# Patient Record
Sex: FEMALE | URBAN - METROPOLITAN AREA
[De-identification: names, ages, dates, MRNs, and addresses within clinical notes are randomized per-mention and may not be internally consistent; named-entity substitution may affect disease eponyms.]

---

## 2018-10-31 ENCOUNTER — APPOINTMENT (OUTPATIENT)
Dept: PHYSICAL THERAPY | Age: 38
End: 2018-10-31

## 2023-01-11 ENCOUNTER — HOSPITAL ENCOUNTER (OUTPATIENT)
Dept: PHYSICAL THERAPY | Age: 43
Discharge: HOME | End: 2023-01-11
Payer: OTHER GOVERNMENT

## 2023-01-11 PROCEDURE — 97162 PT EVAL MOD COMPLEX 30 MIN: CPT

## 2023-01-11 PROCEDURE — 97140 MANUAL THERAPY 1/> REGIONS: CPT

## 2023-01-11 NOTE — PROGRESS NOTES
[facility]  [facility], [address] [facility], [address] [place], [place] [address]y [address],Abhishek 300  [phone] fax      Plan of Care/ Statement of Necessity for Physical Therapy Services    Patient name: Tj Booker Start of Care: 2023   Referral source: Tamie Gates DO : 1980    Medical Diagnosis: Pain in left leg [M79.605]  Payor:  / Plan: Jose Martin 75 / Product Type:  /  Onset Date:2022    Treatment Diagnosis: LBP, left leg pain   Prior Hospitalization: see medical history Provider#: [id number]   Medications: Verified on Patient summary List    Comorbidities: hx of right knee lateral release,  x2   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 Assessment/ key information: Patient is a 40 y. o.female who presents to PT with Pain in left leg [M79.605]. She reports pain started in July after having covid and trying to run. While previously able to run ultramarathons and lift weights without pain, she reports pain in lower back, hips and knees with any LE impact. She presents with lumbopelvic malalignment, thoracic kyphosis, increased lumbar lordosis, notable tightness in the left hip, and pain that prevents activity. The patient will benefit from skilled PT services to address these deficits and facilitate return to premorbid activity level and improved quality of life. Evaluation Complexity History LOW Complexity : Zero comorbidities / personal factors that will impact the outcome / POC; Examination MEDIUM Complexity : 3 Standardized tests and measures addressing body structure, function, activity limitation and / or participation in recreation  ;Presentation MEDIUM Complexity : Evolving with changing characteristics  ; Clinical Decision Making MEDIUM Complexity : FOTO score of 26-74  Overall Complexity Rating: MEDIUM  Problem List: pain affecting function, decrease ROM, decrease strength, decrease ADL/ functional abilitiies, decrease activity tolerance, and decrease flexibility/ joint mobility   Treatment Plan may include any combination of the following: Therapeutic exercise, Neuromuscular reeducation, Manual therapy, Therapeutic activity, Self care/home management, Electric stim unattended , Gait training, Ultrasound, and Electric stim attended  Patient / Family readiness to learn indicated by: asking questions, trying to perform skills, and interest  Persons(s) to be included in education: patient (P)  Barriers to Learning/Limitations: None  Patient Goal (s): increase flexibility  Patient Self Reported Health Status: excellent  Rehabilitation Potential: good    Short Term Goals: To be accomplished in 1 weeks:   1. Establish HEP for ROM and strengthening    Long Term Goals: To be accomplished in 10 treatments:   1. Patient will be independent and compliant with HEP for ROM and strengthening   2. Patient will increase FOTO > 5 pts to increase functional mobility within the home and community. 3. Patient will report <3/10 pain with mobility to increase functional activity tolerance  4. Patient will demo neutral lumbopelvic alignment x 3 consecutive visits to facilitate return to premorbid activity level. Frequency / Duration: Patient to be seen 2 times per week for 10 treatments. Patient/ Caregiver education and instruction: Diagnosis, prognosis, self care, activity modification, and exercises   [x]  Plan of care has been reviewed with JOSE Joshua, PT 1/11/2023 10:38 AM  ________________________________________________________________________    I certify that the above Therapy Services are being furnished while the patient is under my care. I agree with the treatment plan and certify that this therapy is necessary.     [facility] Signature:____________Date:_________TIME:________     Nj Mcclure DO  ** Signature, Date and Time must be completed for valid certification **      Please sign and return to In [address] [place] [address] [address]  [address] [place] [address], [address] [place] [address], [id number] 434,[facility] 300  [phone] fax

## 2023-01-11 NOTE — PROGRESS NOTES
PT DAILY TREATMENT NOTE     PPatient Name: Mary Boucher  Date:2023  : 1980  [x]  Patient  Verified  Payor:  / Plan: Matthieu [employer] / Product Type:  /    In time:1000  Out time:1030  Total Treatment Time (min): 30  Visit #: 1 of 10    Medicare/BCBS Only   Total Timed Codes (min):   1:1 Treatment Time:         Treatment Area: Pain in left leg [M79.605]    SUBJECTIVE  Pain Level (0-10 scale): 2  Any medication changes, allergies to medications, adverse drug reactions, diagnosis change, or new procedure performed?: [x] No    [] Yes (see summary sheet for update)  Subjective functional status/changes:   [x] see eval form in paper chart    OBJECTIVE    20 min []Eval                  []Re-Eval     10 min Manual Therapy:  left upslip correction, MFR left psoas   The manual therapy interventions were performed at a separate and distinct time from the therapeutic activities interventions. Rationale: decrease pain, increase tissue extensibility, and decrease trigger points to improve posture and functional alignment           With   [] TE   [] TA   [] neuro   [] other: Patient Education: [x] Review HEP    [] Progressed/Changed HEP based on:   [] positioning   [] body mechanics   [] transfers   [] heat/ice application    [] other:        Pain Level (0-10 scale) post treatment: 2    ASSESSMENT/Changes in Function:       [x]  See Plan of Care  []  See progress note/recertification  []  See Discharge Summary         Goals:  Short Term Goals: To be accomplished in 1 weeks:               1. Establish HEP for ROM and strengthening  Eval: HEP for self psoas release with tennis ball to tolerance     Long Term Goals: To be accomplished in 10 treatments:               1. Patient will be independent and compliant with HEP for ROM and strengthening              2. Patient will increase FOTO > 5 pts to increase functional mobility within the home and community. Eval: 66  3.  Patient will report <3/10 pain with mobility to increase functional activity tolerance  Eval: 8/10 at worst  4. Patient will demo neutral lumbopelvic alignment x 3 consecutive visits to facilitate return to premorbid activity level.   Eval: increased lordosis, left post rotation, left upslip    PLAN  []  Upgrade activities as tolerated     [x]  Continue plan of care  []  Update interventions per flow sheet       []  Discharge due to:_  []  Other:_      Quynh Snider, PT 1/11/2023  10:43 AM    Future Appointments   Date Time Provider Iris Cervantes   1/13/2023  9:00 AM Lizeth Hummel, PTA [facility]   1/18/2023  1:00 PM Lizeth Hummel, PTA [facility]   1/20/2023  9:30 AM Bianca Delgadillo, PTA [facility]   1/25/2023 10:30 AM Lizeth Hummel, PTA [facility]   1/27/2023  9:30 AM Lizeth Hummel, PTA [facility]   2/1/2023  9:30 AM Leslye Seip, DPT [facility]   2/3/2023  9:30 AM See Jj, DPT [facility]

## 2023-01-13 ENCOUNTER — HOSPITAL ENCOUNTER (OUTPATIENT)
Dept: PHYSICAL THERAPY | Age: 43
Discharge: HOME | End: 2023-01-13
Payer: OTHER GOVERNMENT

## 2023-01-13 PROCEDURE — 97110 THERAPEUTIC EXERCISES: CPT

## 2023-01-13 PROCEDURE — 97112 NEUROMUSCULAR REEDUCATION: CPT

## 2023-01-13 PROCEDURE — 97140 MANUAL THERAPY 1/> REGIONS: CPT

## 2023-01-13 NOTE — PROGRESS NOTES
PT DAILY TREATMENT NOTE     Patient Name: Sue Shaffer  Date:2023  : 1980  [x]  Patient  Verified  Payor: LATOYA / Plan: Jose Martin 74 / Product Type:  /    In time:858 am   Out time:945 am   Total Treatment Time (min): 47  Visit #: 2 of 10     Treatment Area: Pain in left leg [M79.605]  Other low back pain [M54.59]    SUBJECTIVE  Pain Level (0-10 scale): 0/10   Any medication changes, allergies to medications, adverse drug reactions, diagnosis change, or new procedure performed?: [x] No    [] Yes (see summary sheet for update)  Subjective functional status/changes:   [] No changes reported  Patient reports having increased low back after getting up this morning. Patient denies any pain down her left leg today. Patient states that she does not have any low back right now. OBJECTIVE    Modality rationale: decrease inflammation, decrease pain, and increase tissue extensibility to improve the patients ability to assist with performing ADL's and functional tasks without limitations.     Min Type Additional Details    [] Estim:  []Unatt       []IFC  []Premod                        []Other:  []w/ice   []w/heat  Position:  Location:    [] Estim: []Att    []TENS instruct  []NMES                    []Other:  []w/US   []w/ice   []w/heat  Position:  Location:    []  Traction: [] Cervical       []Lumbar                       [] Prone          []Supine                       []Intermittent   []Continuous Lbs:  [] before manual  [] after manual    []  Ultrasound: []Continuous   [] Pulsed                           []1MHz   []3MHz W/cm2:  Location:    []  Iontophoresis with dexamethasone         Location: [] Take home patch   [] In clinic   10 []  Ice     [x]  heat  []  Ice massage  []  Laser   []  Anodyne Position: supine  Location: L/S    []  Laser with stim  []  Other:  Position:  Location:    []  Vasopneumatic Device    []  Right     []  Left  Pre-treatment girth:  Post-treatment girth: Measured at (location):  Pressure:       [] lo [] med [] hi   Temperature: [] lo [] med [] hi   [] Skin assessment post-treatment:  []intact []redness- no adverse reaction  []redness - adverse reaction:       15 min Therapeutic Exercise:  [] See flow sheet :   Rationale: increase ROM and increase strength to improve the patients overall muscle endurance and activity tolerance for ADL's and functional tasks. min Therapeutic Activity:  []  See flow sheet :   Rationale: increase strength and improve coordination  to improve the patients ability to safely perform dynamic activities and to improve functional performance of  ADL's.     12 min Neuromuscular Re-education:  []  See flow sheet :   Rationale: increase strength, improve coordination, and improve balance  to improve the patients ability to perform activities with good form, stability and improve proprioception. 10 min Manual Therapy:  Shot gun mobs/MET; DTM and stretching (B) piriformis and glute med    The manual therapy interventions were performed at a separate and distinct time from the therapeutic activities interventions. Rationale: decrease pain, increase ROM, increase tissue extensibility, decrease trigger points, and increase postural awareness to assist with reducing SI joint dysfunction and decreasing low back pain. With   [] TE   [] TA   [] neuro   [] other: Patient Education: [x] Review HEP    [] Progressed/Changed HEP based on:   [] positioning   [] body mechanics   [] transfers   [] heat/ice application    [] other:      Other Objective/Functional Measures:   Initiated exercises set a initial evaluation. Verbal cues required for proper form. Left posteriorly rotated SI joint      Pain Level (0-10 scale) post treatment: 0/10    ASSESSMENT/Changes in Function:   Patient is progressing as expected towards goals.  Introduced exercises, as per flow sheet, to assist with improving core strength and improving (B) LE mobility. Decreased (B) LE hamstring flexibility noted with today's exercises. SI joint dysfunction improved following manual therapy. Patient responded well to today's overall session, as evident by, no increase in low back pain and improved exercise tolerance. Patient will continue to benefit from skilled PT services to modify and progress therapeutic interventions, address functional mobility deficits, address ROM deficits, address strength deficits, analyze and address soft tissue restrictions, analyze and cue movement patterns, analyze and modify body mechanics/ergonomics, assess and modify postural abnormalities, and instruct in home and community integration to attain remaining goals. []  See Plan of Care  []  See progress note/recertification  []  See Discharge Summary         Progress towards goals / Updated goals:  Short Term Goals: To be accomplished in 1 weeks:               1. Establish HEP for ROM and strengthening  Eval: HEP for self psoas release with tennis ball to tolerance     Long Term Goals: To be accomplished in 10 treatments:               1. Patient will be independent and compliant with HEP for ROM and strengthening              2. Patient will increase FOTO > 5 pts to increase functional mobility within the home and community. Eval: 66  3. Patient will report <3/10 pain with mobility to increase functional activity tolerance  Eval: 8/10 at worst  4. Patient will demo neutral lumbopelvic alignment x 3 consecutive visits to facilitate return to premorbid activity level.   Eval: increased lordosis, left post rotation, left upslip     PLAN  [x]  Upgrade activities as tolerated     [x]  Continue plan of care  []  Update interventions per flow sheet       []  Discharge due to:_  []  Other:_      Gretel Garay PTA 1/13/2023  9:15 AM    Future Appointments   Date Time Provider Iris Cervantes   1/18/2023  1:00 PM [facility]   1/20/2023  9:30 AM Gena Mancini, PTA [facility]   1/25/2023 10:30 AM Lazaro Whitmore, JOSE [facility]   1/27/2023  9:30 AM Lazaro Whitmore, JOSE [facility]   2/1/2023  9:30 AM Jeramie Baez, KETAN [facility]   2/3/2023  9:30 AM Lucy Jj, ZACHARYT [facility]

## 2023-01-18 ENCOUNTER — HOSPITAL ENCOUNTER (OUTPATIENT)
Dept: PHYSICAL THERAPY | Age: 43
Discharge: HOME | End: 2023-01-18
Payer: OTHER GOVERNMENT

## 2023-01-18 PROCEDURE — 97140 MANUAL THERAPY 1/> REGIONS: CPT

## 2023-01-18 PROCEDURE — 97112 NEUROMUSCULAR REEDUCATION: CPT

## 2023-01-18 PROCEDURE — 97535 SELF CARE MNGMENT TRAINING: CPT

## 2023-01-18 PROCEDURE — 97110 THERAPEUTIC EXERCISES: CPT

## 2023-01-18 NOTE — PROGRESS NOTES
PT DAILY TREATMENT NOTE     Patient Name: Sue Shaffer  Date:2023  : 1980  [x]  Patient  Verified  Payor: LATOYA / Plan: Jose Martin 74 / Product Type:  /    In time: 100 pm    Out time: 143 pm    Total Treatment Time (min): 43  Visit #: 3 of 10     Treatment Area: Pain in left leg [M79.605]  Other low back pain [M54.59]    SUBJECTIVE  Pain Level (0-10 scale): 2/10   Any medication changes, allergies to medications, adverse drug reactions, diagnosis change, or new procedure performed?: [x] No    [] Yes (see summary sheet for update)  Subjective functional status/changes:   [] No changes reported  Patient reports having a little bit of low back discomfort. Patient explains that she raked some leaves today and that my have caused the increase in pain today. Patient denies any pain in her left leg today. Patient also explained that her MRI showed that L4-L5 are bulging. Patient states that her doctor would like her to continue with PT.        OBJECTIVE    Modality rationale: Patient declined modalities today.      Min Type Additional Details    [] Estim:  []Unatt       []IFC  []Premod                        []Other:  []w/ice   []w/heat  Position:  Location:    [] Estim: []Att    []TENS instruct  []NMES                    []Other:  []w/US   []w/ice   []w/heat  Position:  Location:    []  Traction: [] Cervical       []Lumbar                       [] Prone          []Supine                       []Intermittent   []Continuous Lbs:  [] before manual  [] after manual    []  Ultrasound: []Continuous   [] Pulsed                           []1MHz   []3MHz W/cm2:  Location:    []  Iontophoresis with dexamethasone         Location: [] Take home patch   [] In clinic    []  Ice     []  heat  []  Ice massage  []  Laser   []  Anodyne Position:   Location:     []  Laser with stim  []  Other:  Position:  Location:    []  Vasopneumatic Device    []  Right     []  Left  Pre-treatment girth:  Post-treatment girth:  Measured at (location):  Pressure:       [] lo [] med [] hi   Temperature: [] lo [] med [] hi   [] Skin assessment post-treatment:  []intact []redness- no adverse reaction  []redness - adverse reaction:       15 min Therapeutic Exercise:  [] See flow sheet :   Rationale: increase ROM and increase strength to improve the patients overall muscle endurance and activity tolerance for ADL's and functional tasks. min Therapeutic Activity:  []  See flow sheet :   Rationale: increase strength and improve coordination  to improve the patients ability to safely perform dynamic activities and to improve functional performance of  ADL's. 10 min Neuromuscular Re-education:  []  See flow sheet :   Rationale: increase strength, improve coordination, and improve balance  to improve the patients ability to perform activities with good form, stability and improve proprioception. 10 min Manual Therapy:  Shot gun mobs/MET; DTM and stretching (B) piriformis and glute med    The manual therapy interventions were performed at a separate and distinct time from the therapeutic activities interventions. Rationale: decrease pain, increase ROM, increase tissue extensibility, decrease trigger points, and increase postural awareness to assist with reducing SI joint dysfunction and decreasing low back pain. 8 min Self Care/Home Management: Prone press up HEP and Felicitas method    Rationale: increase strength, improve coordination, and improve balance  to improve the patients ability to perform HEP safely and effectively in order to perform all functional activities with ease. With   [] TE   [] TA   [] neuro   [] other: Patient Education: [x] Review HEP    [] Progressed/Changed HEP based on:   [] positioning   [] body mechanics   [] transfers   [] heat/ice application    [] other:      Other Objective/Functional Measures:    Added [place] ext and pall of, prone hip extension, alt swimmers and prone press ups     Symmetrical SI joint     Pain Level (0-10 scale) post treatment: 0/10    ASSESSMENT/Changes in Function:   Progressed exercises, as per flow sheet, to assist with increasing core and low back strength. Discussed with patient the Felicitas method and it's benefits of decreasing left LE radicular symptoms. Also instructed on and issued updated HEP. Patient verbalized and demonstrated understanding. Patient presented with symmetrical SI joint today. Patient responded well to today's overall session, as evident by, a decrease in low back pain and improved exercise tolerance. Will continue to progress patient as tolerated. Patient will continue to benefit from skilled PT services to modify and progress therapeutic interventions, address functional mobility deficits, address ROM deficits, address strength deficits, analyze and address soft tissue restrictions, analyze and cue movement patterns, analyze and modify body mechanics/ergonomics, assess and modify postural abnormalities, and instruct in home and community integration to attain remaining goals. []  See Plan of Care  []  See progress note/recertification  []  See Discharge Summary         Progress towards goals / Updated goals:  Short Term Goals: To be accomplished in 1 weeks:               1. Establish HEP for ROM and strengthening  Eval: HEP for self psoas release with tennis ball to tolerance     Long Term Goals: To be accomplished in 10 treatments:               1. Patient will be independent and compliant with HEP for ROM and strengthening              Current: Updated HEP today. 1/18/23  2. Patient will increase FOTO > 5 pts to increase functional mobility within the home and community. Eval: 66  Current: Ongoing, will assess at next PN. 1/18/23  3. Patient will report <3/10 pain with mobility to increase functional activity tolerance  Eval: 8/10 at worst  4.  Patient will demo neutral lumbopelvic alignment x 3 consecutive visits to facilitate return to premorbid activity level. Eval: increased lordosis, left post rotation, left upslip  Current: Patient presented with symmetrical SI joint today. 1/18/23     PLAN  [x]  Upgrade activities as tolerated     [x]  Continue plan of care  []  Update interventions per flow sheet       []  Discharge due to:_  [x]  Other:Assess effectiveness of updated HEP.       Kandace Montoya PTA 1/18/2023  9:15 AM    Future Appointments   Date Time Provider Iris Cervantes   1/18/2023  1:00 PM Guanakito Villareal PTA [facility]   1/20/2023  9:30 AM Nafisa Stuart PTA [facility]   1/25/2023 10:30 AM Guanakito Villareal PTA [facility]   1/27/2023  9:30 AM Guanakito Villareal PTA [facility]   2/1/2023  9:30 AM ZACHARY ChewT [facility]   2/3/2023  9:30 AM Pancho Jj, ZACHARYT [facility]

## 2023-01-20 ENCOUNTER — HOSPITAL ENCOUNTER (OUTPATIENT)
Dept: PHYSICAL THERAPY | Age: 43
Discharge: HOME | End: 2023-01-20
Payer: OTHER GOVERNMENT

## 2023-01-20 PROCEDURE — 97112 NEUROMUSCULAR REEDUCATION: CPT

## 2023-01-20 PROCEDURE — 97110 THERAPEUTIC EXERCISES: CPT

## 2023-01-20 NOTE — PROGRESS NOTES
PT DAILY TREATMENT NOTE     Patient Name: Ken Bañuelos  Date:2023  : 1980  [x]  Patient  Verified  Payor: LATOYA / Plan: Jose Martin 75 / Product Type:  /    In time:9:31  Out time:10:08  Total Treatment Time (min): 37  Visit #: 4 of 10    Medicare/BCBS Only   Total Timed Codes (min):   1:1 Treatment Time:         Treatment Area: Pain in left leg [M79.605]  Other low back pain [M54.59]    SUBJECTIVE  Pain Level (0-10 scale): 0  Any medication changes, allergies to medications, adverse drug reactions, diagnosis change, or new procedure performed?: [x] No    [] Yes (see summary sheet for update)  Subjective functional status/changes:   [] No changes reported  \"Im just tight but no pain. \"    OBJECTIVE    Modality rationale: decrease pain, increase tissue extensibility, and increase muscle contraction/control to improve the patients ability to    Min Type Additional Details    [] Estim:  []Unatt       []IFC  []Premod                        []Other:  []w/ice   []w/heat  Position:  Location:    [] Estim: []Att    []TENS instruct  []NMES                    []Other:  []w/US   []w/ice   []w/heat  Position:  Location:    []  Traction: [] Cervical       []Lumbar                       [] Prone          []Supine                       []Intermittent   []Continuous Lbs:  [] before manual  [] after manual    []  Ultrasound: []Continuous   [] Pulsed                           []1MHz   []3MHz W/cm2:  Location:    []  Iontophoresis with dexamethasone         Location: [] Take home patch   [] In clinic    []  Ice     []  heat  []  Ice massage  []  Laser   []  Anodyne Position:  Location:    []  Laser with stim  []  Other:  Position:  Location:    []  Vasopneumatic Device    []  Right     []  Left  Pre-treatment girth:  Post-treatment girth:  Measured at (location):  Pressure:       [] lo [] med [] hi   Temperature: [] lo [] med [] hi   [x] Skin assessment post-treatment:  [x]intact []redness- no adverse reaction    []redness - adverse reaction:      min []Eval                  []Re-Eval       22 min Therapeutic Exercise:  [x] See flow sheet :   Rationale: increase ROM and increase strength to improve the patients ability to perform ADL      min Therapeutic Activity:  [x]  See flow sheet :   Rationale: increase ROM, increase strength, and improve coordination  to improve the patients ability to perform job duties with no difficulty     10 min Neuromuscular Re-education:  [x]  See flow sheet :   Rationale: increase ROM, increase strength, improve coordination, improve balance, and increase proprioception  to improve the patients ability to perform ADL     5 min Manual Therapy:  checked alignment:    The manual therapy interventions were performed at a separate and distinct time from the therapeutic activities interventions. Rationale: decrease pain, increase ROM, increase tissue extensibility, decrease edema , decrease trigger points, and increase postural awareness to perform daily chores     min Gait Training:  ___ feet with ___ device on level surfaces with ___ level of assist   Rationale:     min Self Care/Home Management:    Rationale: increase ROM, increase strength, and improve coordination  to improve the patients ability to perform household chores          With   [] TE   [] TA   [] neuro   [] other: Patient Education: [x] Review HEP    [] Progressed/Changed HEP based on:   [] positioning   [] body mechanics   [] transfers   [] heat/ice application    [] other:      Other Objective/Functional Measures:  (B) innominate symmetrical    Pain Level (0-10 scale) post treatment:0     ASSESSMENT/Changes in Function: today's session focused on strengthening LSP/left leg and for pain reduction. Pt completed each strengthening there ex well with no pain  or tingling at left LE but soreness at LSP. Pt required cues on log roll when performing bed mobility prone to sit.   Overall pt is progressing towards all goals. Patient will continue to benefit from skilled PT services to modify and progress therapeutic interventions, address functional mobility deficits, address ROM deficits, address strength deficits, analyze and address soft tissue restrictions, analyze and cue movement patterns, analyze and modify body mechanics/ergonomics, assess and modify postural abnormalities, and instruct in home and community integration to attain remaining goals. [x]  See Plan of Care  []  See progress note/recertification  []  See Discharge Summary         Progress towards goals / Updated goals:     1. Establish HEP for ROM and strengthening  Eval: HEP for self psoas release with tennis ball to tolerance     Long Term Goals: To be accomplished in 10 treatments:               1. Patient will be independent and compliant with HEP for ROM and strengthening              Current: Updated HEP today. 1/18/23  2. Patient will increase FOTO > 5 pts to increase functional mobility within the home and community. Eval: 66  Current: Ongoing, will assess at next PN. 1/18/23  3. Patient will report <3/10 pain with mobility to increase functional activity tolerance  Eval: 8/10 at worst  4. Patient will demo neutral lumbopelvic alignment x 3 consecutive visits to facilitate return to premorbid activity level. Eval: increased lordosis, left post rotation, left upslip  Current: Patient presented with symmetrical SI joint today.  1/18/23       PLAN  []  Upgrade activities as tolerated     [x]  Continue plan of care  []  Update interventions per flow sheet       []  Discharge due to:_  []  Other:_      Marlys Mancini PTA 1/20/2023  9:39 AM    Future Appointments   Date Time Provider Iris Cervantes   1/25/2023 10:30 AM Cecelia Villar PTA [facility]   1/27/2023  9:30 AM Cecelia Villar PTA [facility]   2/1/2023  9:30 AM Marie Akins DPT [facility]   2/3/2023  9:30 AM Jhonny Jj DPT [facility]

## 2023-01-25 ENCOUNTER — HOSPITAL ENCOUNTER (OUTPATIENT)
Dept: PHYSICAL THERAPY | Age: 43
Discharge: HOME | End: 2023-01-25
Payer: OTHER GOVERNMENT

## 2023-01-25 PROCEDURE — 97110 THERAPEUTIC EXERCISES: CPT

## 2023-01-25 PROCEDURE — 97140 MANUAL THERAPY 1/> REGIONS: CPT

## 2023-01-25 PROCEDURE — 97530 THERAPEUTIC ACTIVITIES: CPT

## 2023-01-25 PROCEDURE — 97112 NEUROMUSCULAR REEDUCATION: CPT

## 2023-01-25 NOTE — PROGRESS NOTES
PT DAILY TREATMENT NOTE     Patient Name: Kei Escamilla  Date:2023  : 1980  [x]  Patient  Verified  Payor: LATOYA / Plan: Jose Martin 75 / Product Type:  /    In time: 5833 am    Out time: 1109 am   Total Treatment Time (min): 42  Visit #: 5 of 10     Treatment Area: Pain in left leg [M79.605]  Other low back pain [M54.59]    SUBJECTIVE  Pain Level (0-10 scale): 0/10   Any medication changes, allergies to medications, adverse drug reactions, diagnosis change, or new procedure performed?: [x] No    [] Yes (see summary sheet for update)  Subjective functional status/changes:   [] No changes reported  Patient states having increased tightness in her low back and hamstrings today. Patient explains that she has able to do kettle bell swings for the first time with no increase in low back pain or leg pain. Patient reports that the press ups are awkward but they have helped. OBJECTIVE    Modality rationale: Patient declined modalities today.      Min Type Additional Details    [] Estim:  []Unatt       []IFC  []Premod                        []Other:  []w/ice   []w/heat  Position:  Location:    [] Estim: []Att    []TENS instruct  []NMES                    []Other:  []w/US   []w/ice   []w/heat  Position:  Location:    []  Traction: [] Cervical       []Lumbar                       [] Prone          []Supine                       []Intermittent   []Continuous Lbs:  [] before manual  [] after manual    []  Ultrasound: []Continuous   [] Pulsed                           []1MHz   []3MHz W/cm2:  Location:    []  Iontophoresis with dexamethasone         Location: [] Take home patch   [] In clinic    []  Ice     []  heat  []  Ice massage  []  Laser   []  Anodyne Position:   Location:     []  Laser with stim  []  Other:  Position:  Location:    []  Vasopneumatic Device    []  Right     []  Left  Pre-treatment girth:  Post-treatment girth:  Measured at (location):  Pressure:       [] lo [] med [] hi   Temperature: [] lo [] med [] hi   [] Skin assessment post-treatment:  []intact []redness- no adverse reaction  []redness - adverse reaction:       14 min Therapeutic Exercise:  [] See flow sheet :   Rationale: increase ROM and increase strength to improve the patients overall muscle endurance and activity tolerance for ADL's and functional tasks. 8 min Therapeutic Activity:  []  See flow sheet :   Rationale: increase strength and improve coordination  to improve the patients ability to safely perform dynamic activities and to improve functional performance of  ADL's. 10 min Neuromuscular Re-education:  []  See flow sheet :   Rationale: increase strength, improve coordination, and improve balance  to improve the patients ability to perform activities with good form, stability and improve proprioception. 10 min Manual Therapy:  Shot gun mobs/MET; DTM and stretching (B) piriformis and glute med    The manual therapy interventions were performed at a separate and distinct time from the therapeutic activities interventions. Rationale: decrease pain, increase ROM, increase tissue extensibility, decrease trigger points, and increase postural awareness to assist with reducing SI joint dysfunction and decreasing low back pain. With   [] TE   [] TA   [] neuro   [] other: Patient Education: [x] Review HEP    [] Progressed/Changed HEP based on:   [] positioning   [] body mechanics   [] transfers   [] heat/ice application    [] other:      Other Objective/Functional Measures: Added lateral ambulation with PTB, and bridge ladder with PTB    Left posteriorly rotated SI joint    Pain Level (0-10 scale) post treatment: 0/10    ASSESSMENT/Changes in Function:   Patient presents to PT with no low back pain today. Progressed exercises, as per flow sheet, to further assist with increasing core and (B glute strength. Increased muscle fatigue was experienced with today's progressed exercises. Patient presented with SI joint dysfunction that improved following manual therapy. Left LE tightness noted during manual therapy. Patient responded well to today's session, as evident by, improved exercise tolerance and no increase in low back or left LE symptoms. Will continue to progress patient as tolerated. Patient will continue to benefit from skilled PT services to modify and progress therapeutic interventions, address functional mobility deficits, address ROM deficits, address strength deficits, analyze and address soft tissue restrictions, analyze and cue movement patterns, analyze and modify body mechanics/ergonomics, assess and modify postural abnormalities, and instruct in home and community integration to attain remaining goals. []  See Plan of Care  []  See progress note/recertification  []  See Discharge Summary         Progress towards goals / Updated goals:    1. Establish HEP for ROM and strengthening  Eval: HEP for self psoas release with tennis ball to tolerance     Long Term Goals: To be accomplished in 10 treatments:               1. Patient will be independent and compliant with HEP for ROM and strengthening              Current: Patient is complaint with press up HEP. 1/25/2023  2. Patient will increase FOTO > 5 pts to increase functional mobility within the home and community. Eval: 66  Current: Ongoing, will assess at next PN. 1/25/23  3. Patient will report <3/10 pain with mobility to increase functional activity tolerance  Eval: 8/10 at worst  Current: Patient reports 0/10 pain today. 1/25/2023  4. Patient will demo neutral lumbopelvic alignment x 3 consecutive visits to facilitate return to premorbid activity level. Eval: increased lordosis, left post rotation, left upslip  Current: Patient presented with left posteriorly rotated SI joint.  1/25/23    PLAN  [x]  Upgrade activities as tolerated     [x]  Continue plan of care  []  Update interventions per flow sheet       [] Discharge due to:_  []  Other:    Humberto Diego, JOSE 1/25/2023  9:15 AM    Future Appointments   Date Time Provider Iris Cervantes   1/27/2023  9:30 AM Peg Spencerly, PTA [facility]   2/1/2023  9:30 AM Lori Granda DPT [facility]   2/3/2023  9:30 AM Lori Granda DPT [facility]   2/6/2023 10:00 AM Peg Nicely, PTA [facility]   2/8/2023 10:30 AM Peg Nicely, PTA [facility]

## 2023-01-27 ENCOUNTER — HOSPITAL ENCOUNTER (OUTPATIENT)
Dept: PHYSICAL THERAPY | Age: 43
Discharge: HOME | End: 2023-01-27
Payer: OTHER GOVERNMENT

## 2023-01-27 PROCEDURE — 97112 NEUROMUSCULAR REEDUCATION: CPT

## 2023-01-27 PROCEDURE — 97140 MANUAL THERAPY 1/> REGIONS: CPT

## 2023-01-27 PROCEDURE — 97110 THERAPEUTIC EXERCISES: CPT

## 2023-01-27 NOTE — PROGRESS NOTES
PT DAILY TREATMENT NOTE     Patient Name: Reinaldo Osullivan  Date:2023  : 1980  [x]  Patient  Verified  Payor: LATOYA / Plan: Jose Martin 74 / Product Type:  /    In time: 930 am  Out time: 1006 am   Total Treatment Time (min): 36   Visit #: 6 of 10     Treatment Area: Pain in left leg [M79.605]  Other low back pain [M54.59]    SUBJECTIVE  Pain Level (0-10 scale): 0/10   Any medication changes, allergies to medications, adverse drug reactions, diagnosis change, or new procedure performed?: [x] No    [] Yes (see summary sheet for update)  Subjective functional status/changes:   [] No changes reported  Patient reports having \"nerve pain\" in both calfs some time after last visit. Patient states that it lasted for a couple hours. \"It felt like if something would just pop, it would feel better. \" Patient explains that she does not remember doing press ups during the time she was having calf pain but does perform them often because they help. OBJECTIVE    Modality rationale: Patient declined modalities today.      Min Type Additional Details    [] Estim:  []Unatt       []IFC  []Premod                        []Other:  []w/ice   []w/heat  Position:  Location:    [] Estim: []Att    []TENS instruct  []NMES                    []Other:  []w/US   []w/ice   []w/heat  Position:  Location:    []  Traction: [] Cervical       []Lumbar                       [] Prone          []Supine                       []Intermittent   []Continuous Lbs:  [] before manual  [] after manual    []  Ultrasound: []Continuous   [] Pulsed                           []1MHz   []3MHz W/cm2:  Location:    []  Iontophoresis with dexamethasone         Location: [] Take home patch   [] In clinic    []  Ice     []  heat  []  Ice massage  []  Laser   []  Anodyne Position:   Location:     []  Laser with stim  []  Other:  Position:  Location:    []  Vasopneumatic Device    []  Right     []  Left  Pre-treatment girth:  Post-treatment girth:  Measured at (location):  Pressure:       [] lo [] med [] hi   Temperature: [] lo [] med [] hi   [] Skin assessment post-treatment:  []intact []redness- no adverse reaction  []redness - adverse reaction:       11 min Therapeutic Exercise:  [] See flow sheet :   Rationale: increase ROM and increase strength to improve the patients overall muscle endurance and activity tolerance for ADL's and functional tasks. min Therapeutic Activity:  []  See flow sheet :   Rationale: increase strength and improve coordination  to improve the patients ability to safely perform dynamic activities and to improve functional performance of  ADL's. 15 min Neuromuscular Re-education:  []  See flow sheet :   Rationale: increase strength, improve coordination, and improve balance  to improve the patients ability to perform activities with good form, stability and improve proprioception. 10 min Manual Therapy:  Shot gun mobs/MET; DTM and stretching (B) piriformis and glute med    The manual therapy interventions were performed at a separate and distinct time from the therapeutic activities interventions. Rationale: decrease pain, increase ROM, increase tissue extensibility, decrease trigger points, and increase postural awareness to assist with reducing SI joint dysfunction and decreasing low back pain. With   [] TE   [] TA   [] neuro   [] other: Patient Education: [x] Review HEP    [] Progressed/Changed HEP based on:   [] positioning   [] body mechanics   [] transfers   [] heat/ice application    [] other:      Other Objective/Functional Measures: Added triple threats, bridges with (B) shoulder extension at [place]'s weight of exercises, as per flow shet    Symmetrical SI joint    Pain Level (0-10 scale) post treatment: 0/10    ASSESSMENT/Changes in Function:   Progressed exercises, as per flow sheet, to assist with increasing both core and low back strength.  Verbal cues were required to engage core with today's progressed exercises. Patient presented with symmetrical SI joint today. Increased muscle fatigue was experienced with at the end of today's session. Patient is progressing as expected towards goals. Patient responded well to today's session, as evident by, improved exercise tolerance and no increase in left LE radicular symptoms. Patient will continue to benefit from skilled PT services to modify and progress therapeutic interventions, address functional mobility deficits, address ROM deficits, address strength deficits, analyze and address soft tissue restrictions, analyze and cue movement patterns, analyze and modify body mechanics/ergonomics, assess and modify postural abnormalities, and instruct in home and community integration to attain remaining goals. []  See Plan of Care  []  See progress note/recertification  []  See Discharge Summary         Progress towards goals / Updated goals:    1. Establish HEP for ROM and strengthening  Eval: HEP for self psoas release with tennis ball to tolerance     Long Term Goals: To be accomplished in 10 treatments:         1. Patient will be independent and compliant with HEP for ROM and strengthening              Current: Patient is complaint with press up HEP. 1/27/2023    2. Patient will increase FOTO > 5 pts to increase functional mobility within the home and community. Eval: 66  Current: Ongoing, will assess at next PN. 1/27/23    3. Patient will report <3/10 pain with mobility to increase functional activity tolerance  Eval: 8/10 at worst  Current: Patient reports 0/10 pain today. 1/27/2023    4. Patient will demo neutral lumbopelvic alignment x 3 consecutive visits to facilitate return to premorbid activity level. Eval: increased lordosis, left post rotation, left upslip  Current: Patient presented with symmetrical SI joint.  1/27/23    PLAN  [x]  Upgrade activities as tolerated     [x]  Continue plan of care  []  Update interventions per flow sheet       []  Discharge due to:_  []  Other:    Yudy Zuniga PTA 1/27/2023  9:15 AM    Future Appointments   Date Time Provider Iris Cervantes   1/27/2023  9:30 AM Yoel Darling PTA [facility]   2/1/2023  9:30 AM Keiry Keating DPT [facility]   2/3/2023  9:30 AM Keiry Keating DPT [facility]   2/6/2023 10:00 AM Yoel Darling PTA [facility]   2/8/2023 10:30 AM Yoel Darling PTA [facility]

## 2023-02-01 ENCOUNTER — HOSPITAL ENCOUNTER (OUTPATIENT)
Dept: PHYSICAL THERAPY | Age: 43
Discharge: HOME | End: 2023-02-01
Payer: OTHER GOVERNMENT

## 2023-02-01 PROCEDURE — 97112 NEUROMUSCULAR REEDUCATION: CPT

## 2023-02-01 PROCEDURE — 97140 MANUAL THERAPY 1/> REGIONS: CPT

## 2023-02-01 PROCEDURE — 97530 THERAPEUTIC ACTIVITIES: CPT

## 2023-02-01 PROCEDURE — 97110 THERAPEUTIC EXERCISES: CPT

## 2023-02-01 NOTE — PROGRESS NOTES
PT DAILY TREATMENT NOTE     Patient Name: Danyell Snachez  Date:2023  : 1980  [x]  Patient  Verified  Payor: LATOYA / Plan: Jose Martin 74 / Product Type:  /    In time: 9:30A  Out time: 10:10A   Total Treatment Time (min): 40   Visit #: 7 of 10     Treatment Area: Pain in left leg [M79.605]  Other low back pain [M54.59]    SUBJECTIVE  Pain Level (0-10 scale): 4  Any medication changes, allergies to medications, adverse drug reactions, diagnosis change, or new procedure performed?: [x] No    [] Yes (see summary sheet for update)  Subjective functional status/changes:   [] No changes reported  Patient reports exeriencing some pain at low back upon arriving to today's session, however correlates symptom increase to current weather. OBJECTIVE    9 min Therapeutic Exercise:  [x] See flow sheet :   Rationale: increase ROM and increase strength to improve the patients ability to perform ADL's and functional tasks with greater ease       8 min Therapeutic Activity:  [x]  See flow sheet :   Rationale: increase strength and improve coordination  to improve the patients ability to safely perform dynamic activities and to improve functional performance of  ADL's. 11 min Neuromuscular Re-education:  [x]  See flow sheet :   Rationale: increase strength, improve coordination, and improve balance  to improve the patients ability to perform recreational activities with greater ease     12 min Manual Therapy:  (supine) : Left LE Long axis distraction w/ shot gun mobs/MET; (prone): STM/TPr to right glute med and piriformis w/ manually stretching right hip IR/ER; (left sidelying): STM/TPr to right lateral quad/hamstring    The manual therapy interventions were performed at a separate and distinct time from the therapeutic activities interventions.   Rationale: decrease pain, increase ROM, increase tissue extensibility, decrease trigger points, and increase postural awareness to assist with reducing SI joint dysfunction and increase overall tolerance to therex        With   [x] TE   [x] TA   [x] neuro   [] other: Patient Education: [x] Review HEP    [] Progressed/Changed HEP based on:   [] positioning   [] body mechanics   [] transfers   [] heat/ice application    [] other:      Other Objective/Functional Measures:   Progressed ex program per flow sheet  Left Pelvic Upslip  Significant tissue restrictions through right post hip and lateral quad/hamstring     Pain Level (0-10 scale) post treatment: 0     ASSESSMENT/Changes in Function:   Initiated session w/ MT techniques noted above, in which pt demonstrates positive response through optimal pelvic alignment and report of a reduction in symptoms following. Progressed activity program through advancing core stability activities - pt w/ report of muscular fatigue, however completes w/o symptom flare up. Will continue to progress activity program as able and tolerated. Patient will continue to benefit from skilled PT services to modify and progress therapeutic interventions, address functional mobility deficits, address ROM deficits, address strength deficits, analyze and address soft tissue restrictions, analyze and cue movement patterns, analyze and modify body mechanics/ergonomics, assess and modify postural abnormalities, and instruct in home and community integration to attain remaining goals. [x]  See Plan of Care  []  See progress note/recertification  []  See Discharge Summary         Progress towards goals / Updated goals:    1. Establish HEP for ROM and strengthening  Eval: HEP for self psoas release with tennis ball to tolerance     Long Term Goals: To be accomplished in 10 treatments:         1. Patient will be independent and compliant with HEP for ROM and strengthening              Current: Patient is complaint with press up HEP. 1/27/2023    2.  Patient will increase FOTO > 5 pts to increase functional mobility within the home and community. Eval: 66  Current: Ongoing, will assess at next PN. 02/01/23    3. Patient will report <3/10 pain with mobility to increase functional activity tolerance  Eval: 8/10 at worst  Current: Patient reports 0/10 pain today. 1/27/2023    4. Patient will demo neutral lumbopelvic alignment x 3 consecutive visits to facilitate return to premorbid activity level. Eval: increased lordosis, left post rotation, left upslip  Current: Patient presented with symmetrical SI joint.  1/27/23    PLAN  [x]  Upgrade activities as tolerated     [x]  Continue plan of care  [x]  Update interventions per flow sheet       []  Discharge due to:_  []  Other:    Samantha Carlson DPT 2/1/2023  9:15 AM    Future Appointments   Date Time Provider Iris Cervantes   2/3/2023  9:30 AM Kashmir Fuller DPT [facility]   2/6/2023 10:00 AM Richie Angel PTA [facility]   2/8/2023 10:30 AM Richie Angel PTA [facility]

## 2023-02-03 ENCOUNTER — HOSPITAL ENCOUNTER (OUTPATIENT)
Dept: PHYSICAL THERAPY | Age: 43
Discharge: HOME | End: 2023-02-03
Payer: OTHER GOVERNMENT

## 2023-02-03 PROCEDURE — 97112 NEUROMUSCULAR REEDUCATION: CPT

## 2023-02-03 PROCEDURE — 97110 THERAPEUTIC EXERCISES: CPT

## 2023-02-03 PROCEDURE — 97140 MANUAL THERAPY 1/> REGIONS: CPT

## 2023-02-03 PROCEDURE — 97530 THERAPEUTIC ACTIVITIES: CPT

## 2023-02-03 NOTE — PROGRESS NOTES
PT DAILY TREATMENT NOTE     Patient Name: Alysha Prieto  Date:2/3/2023  : 1980  [x]  Patient  Verified  Payor: LATOYA / Plan: Jose Martin 74 / Product Type:  /    In time: 928 am  Out time: 4236 am    Total Treatment Time (min): 47   Visit #: 8 of 10     Treatment Area: Pain in left leg [M79.605]  Other low back pain [M54.59]    SUBJECTIVE  Pain Level (0-10 scale): 0/10   Any medication changes, allergies to medications, adverse drug reactions, diagnosis change, or new procedure performed?: [x] No    [] Yes (see summary sheet for update)  Subjective functional status/changes:   [] No changes reported  Patient states that her low back is feeling better overall. Patient reports having increased hamstring soreness today. Patient explains that she did lunges in her workout and that could be what it's from. OBJECTIVE    Modality rationale: Patient declined modalities today.      Min Type Additional Details    [] Estim:  []Unatt       []IFC  []Premod                        []Other:  []w/ice   []w/heat  Position:  Location:    [] Estim: []Att    []TENS instruct  []NMES                    []Other:  []w/US   []w/ice   []w/heat  Position:  Location:    []  Traction: [] Cervical       []Lumbar                       [] Prone          []Supine                       []Intermittent   []Continuous Lbs:  [] before manual  [] after manual    []  Ultrasound: []Continuous   [] Pulsed                           []1MHz   []3MHz W/cm2:  Location:    []  Iontophoresis with dexamethasone         Location: [] Take home patch   [] In clinic    []  Ice     []  heat  []  Ice massage  []  Laser   []  Anodyne Position:   Location:     []  Laser with stim  []  Other:  Position:  Location:    []  Vasopneumatic Device    []  Right     []  Left  Pre-treatment girth:  Post-treatment girth:  Measured at (location):  Pressure:       [] lo [] med [] hi   Temperature: [] lo [] med [] hi   [] Skin assessment post-treatment:  []intact []redness- no adverse reaction  []redness - adverse reaction:       10 min Therapeutic Exercise:  [] See flow sheet :   Rationale: increase ROM and increase strength to improve the patients overall muscle endurance and activity tolerance for ADL's and functional tasks. 15 min Therapeutic Activity:  []  See flow sheet :   Rationale: increase strength and improve coordination  to improve the patients ability to safely perform dynamic activities and to improve functional performance of  ADL's.     12 min Neuromuscular Re-education:  []  See flow sheet :   Rationale: increase strength, improve coordination, and improve balance  to improve the patients ability to perform activities with good form, stability and improve proprioception. 10 min Manual Therapy:  Shot gun mobs/MET; DTM and stretching (B) piriformis and glute med    The manual therapy interventions were performed at a separate and distinct time from the therapeutic activities interventions. Rationale: decrease pain, increase ROM, increase tissue extensibility, decrease trigger points, and increase postural awareness to assist with reducing SI joint dysfunction and decreasing low back pain. With   [] TE   [] TA   [] neuro   [] other: Patient Education: [x] Review HEP    [] Progressed/Changed HEP based on:   [] positioning   [] body mechanics   [] transfers   [] heat/ice application    [] other:      Other Objective/Functional Measures:   Symmetrical SI joint    Added alternate UE/LE with 25# DB     Pain Level (0-10 scale) post treatment: 0/10    ASSESSMENT/Changes in Function:   Progressed exercises, as per flow sheet, to assist with increasing core and (B) LE strength. Patient was challenged and increased muscle fatigue was experienced after today's progressed exercises. Patient continues to present to PT with symmetrical SI joint.  Patient responded well to today's session, as evident by, improved exercise tolerance and no reports of increased low back pain or symptoms. Patient will continue to benefit from skilled PT services to modify and progress therapeutic interventions, address functional mobility deficits, address ROM deficits, address strength deficits, analyze and address soft tissue restrictions, analyze and cue movement patterns, analyze and modify body mechanics/ergonomics, assess and modify postural abnormalities, and instruct in home and community integration to attain remaining goals. []  See Plan of Care  []  See progress note/recertification  []  See Discharge Summary         Progress towards goals / Updated goals:   1. Establish HEP for ROM and strengthening  Eval: HEP for self psoas release with tennis ball to tolerance     Long Term Goals: To be accomplished in 10 treatments:  1. Patient will be independent and compliant with HEP for ROM and strengthening              Current: Patient is complaint with press up HEP. 2/3/2023     2. Patient will increase FOTO > 5 pts to increase functional mobility within the home and community. Eval: 66  Current: Ongoing, will assess at next PN. 2/3/2023     3. Patient will report <3/10 pain with mobility to increase functional activity tolerance  Eval: 8/10 at worst  Current: Patient reports 0/10 pain today. 2/3/2023     4. Patient will demo neutral lumbopelvic alignment x 3 consecutive visits to facilitate return to premorbid activity level. Eval: increased lordosis, left post rotation, left upslip  Current: Patient presented with symmetrical SI joint today but was rotated, last visit.  2/3/2023      PLAN  [x]  Upgrade activities as tolerated     [x]  Continue plan of care  []  Update interventions per flow sheet       []  Discharge due to:_  []  Other:    Dahlia Valenzuela PTA 2/3/2023  9:15 AM    Future Appointments   Date Time Provider Iris Cervantes   2/6/2023 10:00 AM JOSE Issa [facility]   2/8/2023 10:30 AM [facility]SE Issa [facility]

## 2023-02-06 ENCOUNTER — HOSPITAL ENCOUNTER (OUTPATIENT)
Dept: PHYSICAL THERAPY | Age: 43
Discharge: HOME | End: 2023-02-06
Payer: OTHER GOVERNMENT

## 2023-02-06 PROCEDURE — 97112 NEUROMUSCULAR REEDUCATION: CPT

## 2023-02-06 PROCEDURE — 97140 MANUAL THERAPY 1/> REGIONS: CPT

## 2023-02-06 PROCEDURE — 97110 THERAPEUTIC EXERCISES: CPT

## 2023-02-06 NOTE — PROGRESS NOTES
PT DAILY TREATMENT NOTE     Patient Name: Balbina Mayo  Date:2023  : 1980  [x]  Patient  Verified  Payor: LATOYA / Plan: Jose Martin 74 / Product Type:  /    In time: 101 am  Out time: 0405  Total Treatment Time (min): 37  Visit #: 9 of 10     Treatment Area: Pain in left leg [M79.605]  Other low back pain [M54.59]    SUBJECTIVE  Pain Level (0-10 scale): 0/10   Any medication changes, allergies to medications, adverse drug reactions, diagnosis change, or new procedure performed?: [x] No    [] Yes (see summary sheet for update)  Subjective functional status/changes:   [] No changes reported  \"My back feels pingy. \" Patient explains that her discomfort is all on the right side and has not had in any pain into her legs in a bout a week. \" Patient states that she played in a soccer game yesterday and was able to play he whole time and do some moves that she has not been able to do in awhile. OBJECTIVE    Modality rationale: Patient declined modalities today.      Min Type Additional Details    [] Estim:  []Unatt       []IFC  []Premod                        []Other:  []w/ice   []w/heat  Position:  Location:    [] Estim: []Att    []TENS instruct  []NMES                    []Other:  []w/US   []w/ice   []w/heat  Position:  Location:    []  Traction: [] Cervical       []Lumbar                       [] Prone          []Supine                       []Intermittent   []Continuous Lbs:  [] before manual  [] after manual    []  Ultrasound: []Continuous   [] Pulsed                           []1MHz   []3MHz W/cm2:  Location:    []  Iontophoresis with dexamethasone         Location: [] Take home patch   [] In clinic    []  Ice     []  heat  []  Ice massage  []  Laser   []  Anodyne Position:   Location:     []  Laser with stim  []  Other:  Position:  Location:    []  Vasopneumatic Device    []  Right     []  Left  Pre-treatment girth:  Post-treatment girth:  Measured at (location):  Pressure: [] lo [] med [] hi   Temperature: [] lo [] med [] hi   [] Skin assessment post-treatment:  []intact []redness- no adverse reaction  []redness - adverse reaction:       12 min Therapeutic Exercise:  [] See flow sheet :   Rationale: increase ROM and increase strength to improve the patients overall muscle endurance and activity tolerance for ADL's and functional tasks. min Therapeutic Activity:  []  See flow sheet :   Rationale: increase strength and improve coordination  to improve the patients ability to safely perform dynamic activities and to improve functional performance of  ADL's. 15 min Neuromuscular Re-education:  []  See flow sheet :   Rationale: increase strength, improve coordination, and improve balance  to improve the patients ability to perform activities with good form, stability and improve proprioception. 10 min Manual Therapy:  Shot gun mobs/MET; DTM and stretching (B) piriformis and glute med    The manual therapy interventions were performed at a separate and distinct time from the therapeutic activities interventions. Rationale: decrease pain, increase ROM, increase tissue extensibility, decrease trigger points, and increase postural awareness to assist with reducing SI joint dysfunction and decreasing low back pain. With   [] TE   [] TA   [] neuro   [] other: Patient Education: [x] Review HEP    [] Progressed/Changed HEP based on:   [] positioning   [] body mechanics   [] transfers   [] heat/ice application    [] other:      Other Objective/Functional Measures: Added 2# to prone hip extension and alternate swimmers    Symmetrical SI joint    Muscle spasms present at right glute, QL and piriformis    Pain Level (0-10 scale) post treatment: 0/10    ASSESSMENT/Changes in Function:   Patient reports no low back or left LE radicular pain today or in the last week. Patient found core focused exercises challenging today.  Patient presented with symmetrical SI joint today and muscle spasms decreased following manual therapy. No reports of increased low back pain or radicular symptoms were reported at the end of today's session. Patient is progressing well towards PT goals. Will continue to progress patient as tolerated. Patient will continue to benefit from skilled PT services to modify and progress therapeutic interventions, address functional mobility deficits, address ROM deficits, address strength deficits, analyze and address soft tissue restrictions, analyze and cue movement patterns, analyze and modify body mechanics/ergonomics, assess and modify postural abnormalities, and instruct in home and community integration to attain remaining goals. []  See Plan of Care  []  See progress note/recertification  []  See Discharge Summary         Progress towards goals / Updated goals:   1. Establish HEP for ROM and strengthening  Eval: HEP for self psoas release with tennis ball to tolerance     Long Term Goals: To be accomplished in 10 treatments:  1. Patient will be independent and compliant with HEP for ROM and strengthening              Current: Patient is complaint with press up HEP. 2/6/2023     2. Patient will increase FOTO > 5 pts to increase functional mobility within the home and community. Eval: 66  Current: Ongoing, will assess at next PN. 2/6/2023     3. Patient will report <3/10 pain with mobility to increase functional activity tolerance  Eval: 8/10 at worst  Current: Patient reports 0/10 pain today. 2/6/2023     4. Patient will demo neutral lumbopelvic alignment x 3 consecutive visits to facilitate return to premorbid activity level. Eval: increased lordosis, left post rotation, left upslip  Current: Patient presented with symmetrical SI joint today.  2/6/2023      PLAN  [x]  Upgrade activities as tolerated     [x]  Continue plan of care  []  Update interventions per flow sheet       []  Discharge due to:_  [x]  Other: PN, next visit    Eleazar Rizzo, JOSE 2/6/2023  9:15 AM    Future Appointments   Date Time Provider Iris Cervantes   2/6/2023 10:00 AM JOSE Mcgregor [facility]   2/8/2023 10:30 AM JOSE Mcgregor [facility]

## 2023-02-08 ENCOUNTER — HOSPITAL ENCOUNTER (OUTPATIENT)
Dept: PHYSICAL THERAPY | Age: 43
Discharge: HOME | End: 2023-02-08
Payer: OTHER GOVERNMENT

## 2023-02-08 PROCEDURE — 97530 THERAPEUTIC ACTIVITIES: CPT

## 2023-02-08 PROCEDURE — 97110 THERAPEUTIC EXERCISES: CPT

## 2023-02-08 PROCEDURE — 97140 MANUAL THERAPY 1/> REGIONS: CPT

## 2023-02-08 NOTE — PROGRESS NOTES
[facility] [address] [facility]  [address], [address] 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 o.female who presents to PT with Pain in left leg [M79.605]. She reports pain started in July after having covid and trying to run. While previously able to run ultramarathons and lift weights without pain, she reports pain in lower back, hips and knees with any LE impact. She presents with lumbopelvic malalignment, thoracic kyphosis, increased lumbar lordosis, notable tightness in the left hip, and pain that prevents activity. The patient will benefit from skilled PT services to address these deficits and facilitate return to premorbid activity level and improved quality of life. Progress towards Goals:  Functional Gains: Returned back to workout routine with minimal modifications and frequency of (B) LE radicular symptoms  Functional Deficits: tightness in left LE, pain in morning, play soccer and running.  % improvement: 60% improvement   Pain   Average: 3/10                  Best: 1-3/10                Worst: 6/10  Patient Goal: \"To un bulge these disc and get back to cardio. \"    1. Establish HEP for ROM and strengthening  Eval: HEP for self psoas release with tennis ball to tolerance  Current: Patient reports compliance with HEP everyday. MET      Long Term Goals: To be accomplished in 10 treatments:  1. Patient will be independent and compliant with HEP for ROM and strengthening              Current: Patient reports compliance with HEP everyday. MET      2. Patient will increase FOTO > 5 pts to increase functional mobility within the home and community. Eval: 66  Current: 62 points, Regressed     3. Patient will report <3/10 pain with mobility to increase functional activity tolerance  Eval: 8/10 at worst  Current: Patient reports 0/10 pain today and with functional activities. MET      4. Patient will demo neutral lumbopelvic alignment x 3 consecutive visits to facilitate return to premorbid activity level. Eval: increased lordosis, left post rotation, left upslip  Current: Patient has presented to the last 3 PT sessions with symmetrical SI joint. MET     Goals: to be achieved in 5 weeks:  1. Patient will increase FOTO > 5 pts to increase functional mobility within the home and community. PN: 62 points, Regressed    2. Patient will be able to tolerate running a mile with no increase in low back pain or (B) LE radicular symptoms in order to return back to recreational activities with ease. PN: Patient has stopped running due to experiencing increased low back and (B) LE radicular symptoms. ASSESSMENT/RECOMMENDATIONS:  Patient has attended 10 authorized PT sessions and has progressed well. Patient's occurrence of (B) LE radicular symptoms, SI joint dysfunction and overall low back pain has improved since the start of PT. Patient's FOTO assessment score regressed since last assessed. Patient continues to have increased low back pain first thing in the morning and increased (B) LE pain with cardiovascular activities.  Patient reports 60% improvement with PT and would like to continue attending PT to further assist with returning back to [facility]. Patient will continue to benefit from skilled PT services to modify and progress therapeutic interventions, address functional mobility deficits, address ROM deficits, address strength deficits, analyze and address soft tissue restrictions, analyze and cue movement patterns, analyze and modify body mechanics/ergonomics, assess and modify postural abnormalities, and instruct in home and community integration to attain remaining goals    [x]Continue therapy per initial plan/protocol at a frequency of  1 x per week for 5 weeks  []Continue therapy with the following recommended changes:_____________________      _____________________________________________________________________  []Discontinue therapy progressing towards or have reached established goals  []Discontinue therapy due to lack of appreciable progress towards goals  []Discontinue therapy due to lack of attendance or compliance  []Await Physician's recommendations/decisions regarding therapy  []Other:________________________________________________________________    Thank you for this referral. Jignesh Rosales, PTA 2/8/2023 10:24 AM  NOTE TO PHYSICIAN:  [address]   FAX TO [facility]: [phone]  If you are unable to process this request in 24 hours please contact our office: [phone]    I have read the above report and request that my patient continue as recommended. I have read the above report and request that my patient continue therapy with the following changes/special instructions:_____________________________________  I have read the above report and request that my patient be discharged from therapy.     [de-identified] Signature:____________Date:_________TIME:________     Christ Boyd DO  ** Signature, Date and Time must be completed for valid certification **

## 2023-02-08 NOTE — PROGRESS NOTES
PT DAILY TREATMENT NOTE     Patient Name: Layne Arroyo  Date:2023  : 1980  [x]  Patient  Verified  Payor:  / Plan: Jose Martin 74 / Product Type:  /    In time: 1030 am  Out time: 1107 am   Total Treatment Time (min): 37  Visit #: 10 of 10     Treatment Area: Pain in left leg [M79.605]  Other low back pain [M54.59]    SUBJECTIVE  Pain Level (0-10 scale): 0/10   Any medication changes, allergies to medications, adverse drug reactions, diagnosis change, or new procedure performed?: [x] No    [] Yes (see summary sheet for update)  Subjective functional status/changes:   [] No changes reported  Patient reports being sore after last visit. Patient states that her back pain is worse in the morning and that she has not had pain into her legs in the last couple of days. OBJECTIVE    Modality rationale: Patient declined modalities today.     Min Type Additional Details    [] Estim:  []Unatt       []IFC  []Premod                        []Other:  []w/ice   []w/heat  Position:  Location:    [] Estim: []Att    []TENS instruct  []NMES                    []Other:  []w/US   []w/ice   []w/heat  Position:  Location:    []  Traction: [] Cervical       []Lumbar                       [] Prone          []Supine                       []Intermittent   []Continuous Lbs:  [] before manual  [] after manual    []  Ultrasound: []Continuous   [] Pulsed                           []1MHz   []3MHz W/cm2:  Location:    []  Iontophoresis with dexamethasone         Location: [] Take home patch   [] In clinic    []  Ice     []  heat  []  Ice massage  []  Laser   []  Anodyne Position:   Location:     []  Laser with stim  []  Other:  Position:  Location:    []  Vasopneumatic Device    []  Right     []  Left  Pre-treatment girth:  Post-treatment girth:  Measured at (location):  Pressure:       [] lo [] med [] hi   Temperature: [] lo [] med [] hi   [] Skin assessment post-treatment:  []intact []redness- no adverse reaction  []redness - adverse reaction:       8 min Therapeutic Exercise:  [] See flow sheet :   Rationale: increase ROM and increase strength to improve the patients overall muscle endurance and activity tolerance for ADL's and functional tasks. 19 min Therapeutic Activity:  []  See flow sheet :   Rationale: increase strength and improve coordination  to improve the patients ability to safely perform dynamic activities and to improve functional performance of  ADL's.      min Neuromuscular Re-education:  []  See flow sheet :   Rationale: increase strength, improve coordination, and improve balance  to improve the patients ability to perform activities with good form, stability and improve proprioception. 10 min Manual Therapy:  Shot gun mobs/MET; DTM and stretching (B) piriformis and glute med    The manual therapy interventions were performed at a separate and distinct time from the therapeutic activities interventions. Rationale: decrease pain, increase ROM, increase tissue extensibility, decrease trigger points, and increase postural awareness to assist with reducing SI joint dysfunction and decreasing low back pain. With   [] TE   [] TA   [] neuro   [] other: Patient Education: [x] Review HEP    [] Progressed/Changed HEP based on:   [] positioning   [] body mechanics   [] transfers   [] heat/ice application    [] other:      Other Objective/Functional Measures:   Functional Gains: Returned back to workout routine with minimal modifications and frequency of (B) LE radicular symptoms  Functional Deficits: tightness in left LE, pain in morning, play soccer and running.  % improvement: 60% improvement   Pain   Average: 3/10       Best: 1-3/10     Worst: 6/10  Patient Goal: \"To un bulge these disc and get back to cardio. \"     Symmetrical SI joint     Pain Level (0-10 scale) post treatment: 0/10    ASSESSMENT/Changes in Function:   Patient has attended 10 authorized PT sessions and has progressed well. Patient's occurrence of (B) LE radicular symptoms, SI joint dysfunction and overall low back pain has improved since the start of PT. Patient's FOTO assessment score regressed since last assessed. Patient continues to have increased low back pain first thing in the morning and increased (B) LE pain with cardiovascular activities. Patient reports 60% improvement with PT and would like to continue attending PT to further assist with returning back to [facility]. Patient will continue to benefit from skilled PT services to modify and progress therapeutic interventions, address functional mobility deficits, address ROM deficits, address strength deficits, analyze and address soft tissue restrictions, analyze and cue movement patterns, analyze and modify body mechanics/ergonomics, assess and modify postural abnormalities, and instruct in home and community integration to attain remaining goals. []  See Plan of Care  [x]  See progress note/recertification  []  See Discharge Summary         Progress towards goals / Updated goals:   1. Establish HEP for ROM and strengthening  Eval: HEP for self psoas release with tennis ball to tolerance  Current: Patient reports compliance with HEP everyday. MET     Long Term Goals: To be accomplished in 10 treatments:  1. Patient will be independent and compliant with HEP for ROM and strengthening              Current: Patient reports compliance with HEP everyday. MET      2. Patient will increase FOTO > 5 pts to increase functional mobility within the home and community. Eval: 66  Current: 62 points, Regressed     3. Patient will report <3/10 pain with mobility to increase functional activity tolerance  Eval: 8/10 at worst  Current: Patient reports 0/10 pain today and with functional activities. MET      4. Patient will demo neutral lumbopelvic alignment x 3 consecutive visits to facilitate return to premorbid activity level.   Eval: increased lordosis, left post rotation, left upslip  Current: Patient has presented to the last 3 PT sessions with symmetrical SI joint. MET     PLAN  [x]  Upgrade activities as tolerated     [x]  Continue plan of care  []  Update interventions per flow sheet       []  Discharge due to:_  []  Other:    Dieudonne Leyva, PTA 2/8/2023  9:15 AM    No future appointments.

## 2023-02-16 ENCOUNTER — HOSPITAL ENCOUNTER (OUTPATIENT)
Facility: HOSPITAL | Age: 43
Setting detail: RECURRING SERIES
Discharge: HOME | End: 2023-02-19
Payer: OTHER GOVERNMENT

## 2023-02-16 PROCEDURE — 97530 THERAPEUTIC ACTIVITIES: CPT

## 2023-02-16 PROCEDURE — 97110 THERAPEUTIC EXERCISES: CPT

## 2023-02-16 PROCEDURE — 97112 NEUROMUSCULAR REEDUCATION: CPT

## 2023-02-16 PROCEDURE — 97140 MANUAL THERAPY 1/> REGIONS: CPT

## 2023-02-16 NOTE — PROGRESS NOTES
PHYSICAL / OCCUPATIONAL THERAPY - DAILY TREATMENT NOTE (updated )    Patient Name: Shellie To    Date: 2023    : 1980  Insurance: Payor: [employer] / Plan: [employer]  / Product Type: *No Product type* /      Patient  verified Yes     Visit #   Current / Total 1 5   Time   In / Out 200 pm  250 pmo   Pain   In / Out 0/10  010    Subjective Functional Status/Changes: Patient reports some discomfort but no pain today. Patient states that she just has low back and calf tightness today. Patient explains that she did attempt jogging but couldn't do it for long because of her lower legs getting tight. Changes to:  Meds, Allergies, Med Hx, Sx Hx? If yes, update Summary List no       TREATMENT AREA =  Pain in left leg [M79.605]  Other low back pain [M54.59]    OBJECTIVE         Therapeutic Procedures: Tx Min Billable or 1:1 Min (if diff from Tx Min) Procedure, Rationale, Specifics    8 12218 Therapeutic Exercise (timed):  increase ROM, strength, coordination, balance, and proprioception to improve patient's ability to progress to PLOF and address remaining functional goals. (see flow sheet as applicable)     Details if applicable:        15 56934 Therapeutic Activity (timed):  use of dynamic activities replicating functional movements to increase ROM, strength, coordination, balance, and proprioception in order to improve patient's ability to progress to PLOF and address remaining functional goals. (see flow sheet as applicable)     Details if applicable:      15 00828 Neuromuscular Re-Education (timed):  improve balance, coordination, kinesthetic sense, posture, core stability and proprioception to improve patient's ability to develop conscious control of individual muscles and awareness of position of extremities in order to progress to PLOF and address remaining functional goals.  (see flow sheet as applicable)     Details if applicable:      12 94416 Manual Therapy (timed):  decrease pain, increase ROM, increase tissue extensibility, decrease trigger points, and increase postural awareness to improve patient's ability to progress to PLOF and address remaining functional goals. The manual therapy interventions were performed at a separate and distinct time from the therapeutic activities interventions . (see flow sheet as applicable)     Details if applicable:            Details if applicable:       [facility] Totals Reminder: bill using total billable min of TIMED therapeutic procedures (example: do not include dry needle or estim unattended, both untimed codes, in totals to left)  8-22 min = 1 unit; 23-37 min = 2 units; 38-52 min = 3 units; 53-67 min = 4 units; 68-82 min = 5 units   Total Total     [x]  Patient Education billed concurrently with other procedures   [x] Review HEP    [] Progressed/Changed HEP, detail:    [] Other detail:       Objective Information/Functional Measures/Assessment  Symmetrical SI joint     Patient continues to present to PT with decreased low pain and symmetrical SI joint. Patient has noticed a slight increase in low back symptoms since decreasing to once a week. Patient explained that she knows that is from not being as consistent with HEP at home. Progressed reps/weight of exercises, as per flow sheet, to further assist with increasing core and low back strength. Patient responded well to today's overall session, as evident by, no increase in pain/symptoms and improved exercise tolerance.      Patient will continue to benefit from skilled PT / OT services to modify and progress therapeutic interventions, analyze and address functional mobility deficits, analyze and address ROM deficits, analyze and address strength deficits, analyze and address soft tissue restrictions, analyze and cue for proper movement patterns, analyze and modify for postural abnormalities, and instruct in home and community integration to address functional deficits and attain remaining goals.    Progress toward goals / Updated goals:  []  See Progress Note/Recertification  1. Patient will increase FOTO > 5 pts to increase functional mobility within the home and community. PN: 62 points, Regressed     2. Patient will be able to tolerate running a mile with no increase in low back pain or (B) LE radicular symptoms in order to return back to recreational activities with ease. PN: Patient has stopped running due to experiencing increased low back and (B) LE radicular symptoms.      PLAN  Yes  Continue plan of care  []  Upgrade activities as tolerated  []  Discharge due to :  []  Other:    Hakan Draper PTA    2/16/2023    1:54 PM    Future Appointments   Date Time Provider Luiza Lan   2/16/2023  2:00 PM Leonides Parnell PTA [facility]   2/22/2023  1:30 PM SOFIA Chinchilla [facility]

## 2023-02-22 ENCOUNTER — HOSPITAL ENCOUNTER (OUTPATIENT)
Facility: HOSPITAL | Age: 43
Setting detail: RECURRING SERIES
Discharge: HOME | End: 2023-02-25
Payer: OTHER GOVERNMENT

## 2023-02-22 PROCEDURE — 97112 NEUROMUSCULAR REEDUCATION: CPT

## 2023-02-22 PROCEDURE — 97110 THERAPEUTIC EXERCISES: CPT

## 2023-02-22 PROCEDURE — 97530 THERAPEUTIC ACTIVITIES: CPT

## 2023-02-22 PROCEDURE — 97140 MANUAL THERAPY 1/> REGIONS: CPT

## 2023-02-22 NOTE — PROGRESS NOTES
PHYSICAL / OCCUPATIONAL THERAPY - DAILY TREATMENT NOTE (updated )    Patient Name: Karine Preciado    Date: 2023    : 1980  Insurance: Payor:  EAST / Plan: [employer]  / Product Type: *No Product type* /      Patient  verified Yes     Visit #   Current / Total 2 5   Time   In / Out 130 pm   215 pm   Pain   In / Out 0/10  0/10    Subjective Functional Status/Changes: Patient states that she experienced increased low back \"nerve pain\" earlier today when rowing. Patient explains that the pain did not go below her low back and she has not had nerve pain into her legs in long time. Changes to:  Meds, Allergies, Med Hx, Sx Hx? If yes, update Summary List no       TREATMENT AREA =  Pain in left leg [M79.605]  Other low back pain [M54.59]    OBJECTIVE         Therapeutic Procedures: Tx Min Billable or 1:1 Min (if diff from Tx Min) Procedure, Rationale, Specifics    10 73424 Therapeutic Exercise (timed):  increase ROM, strength, coordination, balance, and proprioception to improve patient's ability to progress to PLOF and address remaining functional goals. (see flow sheet as applicable)     Details if applicable:        15 18633 Therapeutic Activity (timed):  use of dynamic activities replicating functional movements to increase ROM, strength, coordination, balance, and proprioception in order to improve patient's ability to progress to PLOF and address remaining functional goals. (see flow sheet as applicable)     Details if applicable:      10 36014 Neuromuscular Re-Education (timed):  improve balance, coordination, kinesthetic sense, posture, core stability and proprioception to improve patient's ability to develop conscious control of individual muscles and awareness of position of extremities in order to progress to PLOF and address remaining functional goals.  (see flow sheet as applicable)     Details if applicable:      10 45241 Manual Therapy (timed):  decrease pain, increase ROM, increase tissue extensibility, decrease trigger points, and increase postural awareness to improve patient's ability to progress to PLOF and address remaining functional goals. The manual therapy interventions were performed at a separate and distinct time from the therapeutic activities interventions . (see flow sheet as applicable)     Details if applicable:            Details if applicable:      39 [facility] Totals Reminder: bill using total billable min of TIMED therapeutic procedures (example: do not include dry needle or estim unattended, both untimed codes, in totals to left)  8-22 min = 1 unit; 23-37 min = 2 units; 38-52 min = 3 units; 53-67 min = 4 units; 68-82 min = 5 units   Total Total     [x]  Patient Education billed concurrently with other procedures   [x] Review HEP    [] Progressed/Changed HEP, detail:    [] Other detail:       Objective Information/Functional Measures/Assessment  Symmetrical SI joint     Added Una dead lifts and squats    Progressed exercises, as per flow sheet, to assist with improving (B) LE strength and to assess form with squats and dead lifts. Patient tolerated progressed exercises well with no increase in low back pain but verbal cues were required for proper form with squats. Patient presented with symmetrical SI joint. Decreased (B) LE tightness was noted during manual therapy. No increase in low back pain or signs or symptoms were reported at the end of today's session. Patient will continue to benefit from skilled PT / OT services to modify and progress therapeutic interventions, analyze and address functional mobility deficits, analyze and address ROM deficits, analyze and address strength deficits, analyze and address soft tissue restrictions, analyze and cue for proper movement patterns, analyze and modify for postural abnormalities, and instruct in home and community integration to address functional deficits and attain remaining goals.     Progress toward goals / Updated goals:  []  See Progress Note/Recertification  1. Patient will increase FOTO > 5 pts to increase functional mobility within the home and community. PN: 62 points, Regressed     2. Patient will be able to tolerate running a mile with no increase in low back pain or (B) LE radicular symptoms in order to return back to recreational activities with ease. PN: Patient has stopped running due to experiencing increased low back and (B) LE radicular symptoms.      PLAN  Yes  Continue plan of care  []  Upgrade activities as tolerated  []  Discharge due to :  []  Other:    Amber Stevenson PTA    2/22/2023    2:52 PM    Future Appointments   Date Time Provider Luiza Lan   3/2/2023 12:00 PM Amber Stevenson PTA [facility]   3/7/2023  9:30 AM Amber Stevenson PTA [facility]

## 2023-03-02 ENCOUNTER — HOSPITAL ENCOUNTER (OUTPATIENT)
Facility: HOSPITAL | Age: 43
Setting detail: RECURRING SERIES
Discharge: HOME | End: 2023-03-05
Payer: OTHER GOVERNMENT

## 2023-03-02 PROCEDURE — 97110 THERAPEUTIC EXERCISES: CPT

## 2023-03-02 PROCEDURE — 97112 NEUROMUSCULAR REEDUCATION: CPT

## 2023-03-02 PROCEDURE — 97530 THERAPEUTIC ACTIVITIES: CPT

## 2023-03-02 NOTE — PROGRESS NOTES
PHYSICAL / OCCUPATIONAL THERAPY - DAILY TREATMENT NOTE (updated )    Patient Name: Power Hart    Date: 3/2/2023    : 1980  Insurance: Payor: [employer] / Plan: [employer] / Product Type: *No Product type* /      Patient  verified Yes     Visit #   Current / Total 3 5   Time   In / Out 1200 1240   Pain   In / Out 0/10 0/10   Subjective Functional Status/Changes: Pt reports no back pain today. States she was able to do deadlifts during her workout yesterday without pain   Changes to:  Meds, Allergies, Med Hx, Sx Hx? If yes, update Summary List no       TREATMENT AREA =  Pain in left leg [M79.605]  Other low back pain [M54.59]    OBJECTIVE         Therapeutic Procedures: Tx Min Billable or 1:1 Min (if diff from Tx Min) Procedure, Rationale, Specifics   22  [id number] Therapeutic Exercise (timed):  increase ROM, strength, coordination, balance, and proprioception to improve patient's ability to progress to PLOF and address remaining functional goals. (see flow sheet as applicable)     Details if applicable:       8  20580 Therapeutic Activity (timed):  use of dynamic activities replicating functional movements to increase ROM, strength, coordination, balance, and proprioception in order to improve patient's ability to progress to PLOF and address remaining functional goals. (see flow sheet as applicable)     Details if applicable:     10  58188 Neuromuscular Re-Education (timed):  improve balance, coordination, kinesthetic sense, posture, core stability and proprioception to improve patient's ability to develop conscious control of individual muscles and awareness of position of extremities in order to progress to PLOF and address remaining functional goals.  (see flow sheet as applicable)     Details if applicable:            Details if applicable:            Details if applicable:     36  [facility] Totals Reminder: bill using total billable min of TIMED therapeutic procedures (example: do not include dry needle or estim unattended, both untimed codes, in totals to left)  8-22 min = 1 unit; 23-37 min = 2 units; 38-52 min = 3 units; 53-67 min = 4 units; 68-82 min = 5 units   Total Total     [x]  Patient Education billed concurrently with other procedures   [x] Review HEP    [] Progressed/Changed HEP, detail:    [] Other detail:       Objective Information/Functional Measures/Assessment    Added monster walks, cowboys with TB  Pt making steady progress towards goals. Pt tolerated treatment session well without increased pain or discomfort. Pt required verbal cues for proper form and mechanics with therex. No pelvic obliquity noted this visit. No pain post treatment session. Will continue to progress as tolerated to address remaining deficits. Patient will continue to benefit from skilled PT / OT services to modify and progress therapeutic interventions, analyze and address functional mobility deficits, analyze and address ROM deficits, analyze and address strength deficits, analyze and address soft tissue restrictions, analyze and cue for proper movement patterns, and analyze and modify for postural abnormalities to address functional deficits and attain remaining goals. Progress toward goals / Updated goals:  []  See Progress Note/Recertification    1. Patient will increase FOTO > 5 pts to increase functional mobility within the home and community. PN: 62 points, Regressed     2. Patient will be able to tolerate running a mile with no increase in low back pain or (B) LE radicular symptoms in order to return back to recreational activities with ease. PN: Patient has stopped running due to experiencing increased low back and (B) LE radicular symptoms.   Current: Pt reports she has not attempted running yet, 3/2/23       PLAN  Yes  Continue plan of care  []  Upgrade activities as tolerated  []  Discharge due to :  []  Other:    Simone Thompson, PTA    3/2/2023    12:22 PM    Future Appointments   Date Time Provider Luiza Lan   3/7/2023  9:30 AM Layne Sheridan PTA [facility]

## 2023-03-07 ENCOUNTER — HOSPITAL ENCOUNTER (OUTPATIENT)
Facility: HOSPITAL | Age: 43
Setting detail: RECURRING SERIES
Discharge: HOME | End: 2023-03-10
Payer: OTHER GOVERNMENT

## 2023-03-07 PROCEDURE — 97140 MANUAL THERAPY 1/> REGIONS: CPT

## 2023-03-07 PROCEDURE — 97112 NEUROMUSCULAR REEDUCATION: CPT

## 2023-03-07 PROCEDURE — 97530 THERAPEUTIC ACTIVITIES: CPT

## 2023-03-07 NOTE — PROGRESS NOTES
PHYSICAL / OCCUPATIONAL THERAPY - DAILY TREATMENT NOTE (updated )    Patient Name: Mariela Keenan    Date: 3/7/2023    : 1980  Insurance: Payor:  EAST / Plan: [employer] / Product Type: *No Product type* /      Patient  verified Yes     Visit #   Current / Total 4 5   Time   In / Out 930 am  1009 am    Pain   In / Out 0/10  0/10   Subjective Functional Status/Changes: Patient denies any low back or left LE leg pain. \"I have been able to do more lifting when working out with no pain. I just have increased low back tightness. \"    Changes to:  Meds, Allergies, Med Hx, Sx Hx? If yes, update Summary List no       TREATMENT AREA =  Pain in left leg [M79.605]  Other low back pain [M54.59]    OBJECTIVE    Therapeutic Procedures: Tx Min Billable or 1:1 Min (if diff from Tx Min) Procedure, Rationale, Specifics   9 9 05967 Manual Therapy (timed):  decrease pain, increase ROM, increase tissue extensibility, decrease trigger points, and increase postural awareness to improve patient's ability to progress to PLOF and address remaining functional goals. The manual therapy interventions were performed at a separate and distinct time from the therapeutic activities interventions . (see flow sheet as applicable)    Details if applicable:  Shot gun mobs/MET; DTM and stretching (B) piriformis and glute med    20 20 89854 Therapeutic Activity (timed):  use of dynamic activities replicating functional movements to increase ROM, strength, coordination, balance, and proprioception in order to improve patient's ability to progress to PLOF and address remaining functional goals.   (see flow sheet as applicable)     Details if applicable:     10 10 78359 Neuromuscular Re-Education (timed):  improve balance, coordination, kinesthetic sense, posture, core stability and proprioception to improve patient's ability to develop conscious control of individual muscles and awareness of position of extremities in order to progress to PLOF and address remaining functional goals. (see flow sheet as applicable)     Details if applicable:                      Details if applicable:     44 39 [facility] Totals Reminder: bill using total billable min of TIMED therapeutic procedures (example: do not include dry needle or estim unattended, both untimed codes, in totals to left)  8-22 min = 1 unit; 23-37 min = 2 units; 38-52 min = 3 units; 53-67 min = 4 units; 68-82 min = 5 units   Total Total     [x]  Patient Education billed concurrently with other procedures   [x] Review HEP    [] Progressed/Changed HEP, detail:    [] Other detail:       Objective Information/Functional Measures/Assessment    Right posteriorly rotated SI joint  Progressed reps/weight of exercises, as per flow sheet  Patient tolerated ther ex well with no reports of increased low back pain. Patient will continue to benefit from skilled PT / OT services to modify and progress therapeutic interventions, analyze and address functional mobility deficits, analyze and address ROM deficits, analyze and address strength deficits, analyze and address soft tissue restrictions, analyze and cue for proper movement patterns, analyze and modify for postural abnormalities, and instruct in home and community integration to address functional deficits and attain remaining goals. Progress toward goals / Updated goals:  []  See Progress Note/Recertification  1. Patient will increase FOTO > 5 pts to increase functional mobility within the home and community. PN: 62 points, Regressed     2. Patient will be able to tolerate running a mile with no increase in low back pain or (B) LE radicular symptoms in order to return back to recreational activities with ease. PN: Patient has stopped running due to experiencing increased low back and (B) LE radicular symptoms.      PLAN  Yes  Continue plan of care  []  Upgrade activities as tolerated  []  Discharge due to :  [x]  Other:Discharge, next visit    Violetta Faulkner PTA    3/7/2023    10:23 AM    Future Appointments   Date Time Provider Luiza Lan   3/17/2023  9:00 AM [facility]

## 2023-03-17 ENCOUNTER — HOSPITAL ENCOUNTER (OUTPATIENT)
Facility: HOSPITAL | Age: 43
Setting detail: RECURRING SERIES
Discharge: HOME | End: 2023-03-20
Payer: OTHER GOVERNMENT

## 2023-03-17 PROCEDURE — 97110 THERAPEUTIC EXERCISES: CPT

## 2023-03-17 PROCEDURE — 97530 THERAPEUTIC ACTIVITIES: CPT

## 2023-03-17 PROCEDURE — 97140 MANUAL THERAPY 1/> REGIONS: CPT

## 2023-03-17 NOTE — PROGRESS NOTES
applicable)     Details if applicable:     10 10 36463 Manual Therapy (timed):  decrease pain, increase ROM, increase tissue extensibility, decrease trigger points, and increase postural awareness to improve patient's ability to progress to PLOF and address remaining functional goals. The manual therapy interventions were performed at a separate and distinct time from the therapeutic activities interventions . (see flow sheet as applicable)     Details if applicable:  Shot gun mobs/MET; DTM and stretching (B) piriformis and glute med           Details if applicable:            Details if applicable:     36 40 [facility] Totals Reminder: bill using total billable min of TIMED therapeutic procedures (example: do not include dry needle or estim unattended, both untimed codes, in totals to left)  8-22 min = 1 unit; 23-37 min = 2 units; 38-52 min = 3 units; 53-67 min = 4 units; 68-82 min = 5 units   Total Total       [x]  Patient Education billed concurrently with other procedures   [x] Review HEP    [] Progressed/Changed HEP, detail:    [] Other detail:       Objective Information/Functional Measures/Assessment    Patient has attended all authorized PT appointments and has progressed well towards goals. Patient has not experienced any increase in low back pain or radicular symptoms in several weeks. Patient has returned to full workouts with no increase in pain or signs or symptoms. Patient to be discharged at this time. GOALS    Patient will increase FOTO > 5 pts to increase functional mobility within the home and community. Status at last Eval: 62 points, Regressed  Current Status: 83 points  Goal Met?  yes    2. Patient will be able to tolerate running a mile with no increase in low back pain or (B) LE radicular symptoms in order to return back to recreational activities with ease. Status at last Eval: Patient has stopped running due to experiencing increased low back and (B) LE radicular symptoms.   Current